# Patient Record
Sex: FEMALE | Race: OTHER | HISPANIC OR LATINO | Employment: UNEMPLOYED | URBAN - METROPOLITAN AREA
[De-identification: names, ages, dates, MRNs, and addresses within clinical notes are randomized per-mention and may not be internally consistent; named-entity substitution may affect disease eponyms.]

---

## 2024-11-11 ENCOUNTER — HOSPITAL ENCOUNTER (OUTPATIENT)
Dept: RADIOLOGY | Facility: HOSPITAL | Age: 49
Discharge: HOME | End: 2024-11-11
Payer: MEDICAID

## 2024-11-11 DIAGNOSIS — N93.9 ABNORMAL UTERINE BLEEDING (AUB): ICD-10-CM

## 2024-11-11 PROCEDURE — 76856 US EXAM PELVIC COMPLETE: CPT | Mod: TC

## 2024-11-13 ENCOUNTER — CLINICAL SUPPORT (OUTPATIENT)
Dept: GYNECOLOGY | Facility: CLINIC | Age: 49
End: 2024-11-13
Payer: MEDICAID

## 2024-11-13 DIAGNOSIS — N93.9 ABNORMAL UTERINE BLEEDING (AUB): Primary | ICD-10-CM

## 2024-11-14 NOTE — PROGRESS NOTES
MD made HIPAA compliant phone call for scheduled telehealth. No answer X2. No voicemail set up. Will re-scheduled for soonest available date.     Latasha Lane MD   PGY2, Obstetrics & Gynecology   [facility]

## 2024-12-13 ENCOUNTER — OFFICE VISIT (OUTPATIENT)
Dept: GYNECOLOGY | Facility: CLINIC | Age: 49
End: 2024-12-13
Payer: MEDICAID

## 2024-12-13 VITALS
RESPIRATION RATE: 18 BRPM | OXYGEN SATURATION: 99 % | HEIGHT: 64 IN | DIASTOLIC BLOOD PRESSURE: 67 MMHG | HEART RATE: 74 BPM | WEIGHT: 140.19 LBS | SYSTOLIC BLOOD PRESSURE: 104 MMHG | TEMPERATURE: 98 F | BODY MASS INDEX: 23.93 KG/M2

## 2024-12-13 DIAGNOSIS — N93.9 ABNORMAL UTERINE BLEEDING (AUB): Primary | ICD-10-CM

## 2024-12-13 PROCEDURE — 99213 OFFICE O/P EST LOW 20 MIN: CPT | Mod: PBBFAC

## 2024-12-14 NOTE — PROGRESS NOTES
[facility] OB/GYN CLINIC NOTE  [facility]  [address] [place] [address]  Phone: [phone]  Fax: [phone]    Subjective:     Pat Irvin is a 48 y.o.  presenting to GYN clinic for follow-up for AUB.     Patient was seen in ED on 10/2 for continuous vaginal bleeding since . She completed 10 day course of provera upon discharge and stopped bleeding on 10/12. No further bleeding since then. Very happy with decreased bleeding profile. Notes that prior to September her cycle was regular lasting 4 days with minimal bleeding profile.          MedHx:   History reviewed. No pertinent past medical history.    SurgHx:   Past Surgical History:   Procedure Laterality Date     SECTION      CHOLECYSTECTOMY      COLD KNIFE CONIZATION OF CERVIX N/A 2023    Procedure: CONE BIOPSY, CERVIX, USING COLD KNIFE;  Surgeon: Karlee Salazar MD;  Location: [facility] OR;  Service: OB/GYN;  Laterality: N/A;    COLONOSCOPY N/A 8/15/2023    Procedure: COLON;  Surgeon: Lamar Bell MD;  Location: [facility] ENDOSCOPY;  Service: Gastroenterology;  Laterality: N/A;    TUBAL LIGATION         Medications:     Current Outpatient Medications:     ALLERGY RELIEF, CETIRIZINE, 10 mg tablet, Take 10 mg by mouth., Disp: , Rfl:     medroxyPROGESTERone (PROVERA) 10 MG tablet, Take 1 tablet (10 mg total) by mouth once daily. (Patient not taking: Reported on 2024), Disp: 21 tablet, Rfl: 0    polyethylene glycol (GLYCOLAX) 17 gram PwPk, Take 17 g by mouth once daily. (Patient not taking: Reported on 2024), Disp: 30 packet, Rfl: 3    SENNA 8.6 mg tablet, Take 1 tablet by mouth once daily. (Patient not taking: Reported on 2024), Disp: 7 tablet, Rfl: 0  No current facility-administered medications for this visit.    Facility-Administered Medications Ordered in Other Visits:     diphenhydrAMINE injection 25 mg, 25 mg, Intravenous, Once PRN, Gaby Rincon MD    HYDROmorphone injection 0.2 mg, 0.2 mg,  "Intravenous, Q5 Min PRN, Gaby Rincon MD    HYDROmorphone injection 0.5 mg, 0.5 mg, Intravenous, Q5 Min PRN, Gaby Rincon MD    lactated ringers infusion, , Intravenous, Continuous, Gaby Rincon MD, Last Rate: 10 mL/hr at 05/30/23 1358, Restarted at 05/30/23 1408    ondansetron injection 4 mg, 4 mg, Intravenous, Once, Gaby Rincon MD    oxyCODONE-acetaminophen 5-325 mg per tablet 2 tablet, 2 tablet, Oral, Once, Gaby Rincon MD    prochlorperazine injection Soln 5 mg, 5 mg, Intravenous, Once PRN, Gaby Rincon MD    FM Hx: Denies hx of ovarian, uterine, endometrial, or colon cancer. Denies history of bleeding or coagulation disorders.  Family History   Family history unknown: Yes       Social Hx: Denies tobacco, alcohol and illicit drug usage.  Social History     Socioeconomic History    Marital status:    Tobacco Use    Smoking status: Never    Smokeless tobacco: Never   Substance and Sexual Activity    Alcohol use: Never    Drug use: Never    Sexual activity: Not Currently       Review of Systems  Denies fevers, chills, headache, blurry vision, nausea, vomiting, dizziness, or syncope.   Denies chest pain, shortness of breath, RUQ pain, or calf pain.    Objective:     Vitals:    12/13/24 1105   BP: 104/67   BP Location: Right arm   Patient Position: Sitting   Pulse: 74   Resp: 18   Temp: 98.2 °F (36.8 °C)   TempSrc: Oral   SpO2: 99%   Weight: 63.6 kg (140 lb 3.2 oz)   Height: 5' 4" (1.626 m)     Body mass index is 24.07 kg/m².    Physical Exam:     General: alert and oriented, in no acute distress  Abdomen: Soft, non-distended, non tender to palpation, no involuntary guarding, no rebound tenderness  Extremities: Normal, atraumatic, non-edematous, No cords or calf tenderness, No significant calf/ankle edema         Imaging  EXAMINATION:  US PELVIS COMP WITH TRANSVAG NON-OB (XPD)     CLINICAL HISTORY:  Abnormal uterine and vaginal bleeding, unspecified.   "   COMPARISON:  None     FINDINGS:  Transvaginal and trans abdominal pelvic ultrasound was performed.  The uterus measures 10.5 x 3.9 x 6.0 cm.  The endometrial stripe measures approximately0.8 cm in thickness.  Cervical nabothian cysts are noted.  The right ovary measures 4.2 x 2.4 x 3.1 cm.  The left ovary measures 2.8 x 2.6 x 3.8 cm.The right ovary demonstrates a 2.2 x 2.2 x 3.1 cm simple appearing cyst.  The left ovary demonstrates a 2.7 x 2.3 x 2.2 cm simple appearing cyst.  Both ovaries demonstrate arterial and venous Doppler flow.  No definite adnexal mass are sonographically demonstrated.  No significant free fluid within the pelvis is sonographically demonstrated.     Impression:     1. Both ovaries demonstrate simple appearing cysts measuring up to 2.6 x 3.5 x 2.1 cm on the right and 2.7 x 2.3 x 2.2 cm on the left.  2. No definite acute abnormalities are sonographically demonstrated.        Pat ramosjoemalcolm Nathaniel Irvin is a 48 y.o.  presenting to GYN clinic for follow-up for AUB.     Problem List Items Addressed This Visit          Renal/    Abnormal uterine bleeding (AUB) - Primary     Patients heavy bleeding resolved after provera course    No bleeding since 10/2024  CBC, TSH WNL   TVUS unremarkable   Discussed the change in bleeding pattern likely 2/2 to postmenopausal status   Risk for endometrial cancer is only age greater than 45   Patient made aware that definition of menopause is no menses for 1 year                 Future Appointments   Date Time Provider Department Center   1/15/2025  9:50 AM Shahrzad Donato, JASONP [facility] GYN [facility]       Patient and plan were discussed with Dr. Salazar .    Follow up for WWE 2025

## 2024-12-14 NOTE — ASSESSMENT & PLAN NOTE
Patients heavy bleeding resolved after provera course    No bleeding since 10/2024  CBC, TSH WNL   TVUS unremarkable   Discussed the change in bleeding pattern likely 2/2 to postmenopausal status   Risk for endometrial cancer is only age greater than 45   Patient made aware that definition of menopause is no menses for 1 year

## 2025-01-15 ENCOUNTER — OFFICE VISIT (OUTPATIENT)
Dept: GYNECOLOGY | Facility: CLINIC | Age: 50
End: 2025-01-15
Payer: MEDICAID

## 2025-01-15 VITALS
HEART RATE: 62 BPM | OXYGEN SATURATION: 98 % | DIASTOLIC BLOOD PRESSURE: 72 MMHG | SYSTOLIC BLOOD PRESSURE: 111 MMHG | HEIGHT: 64 IN | BODY MASS INDEX: 24.14 KG/M2 | TEMPERATURE: 99 F | RESPIRATION RATE: 18 BRPM | WEIGHT: 141.38 LBS

## 2025-01-15 DIAGNOSIS — Z01.419 WOMEN'S ANNUAL ROUTINE GYNECOLOGICAL EXAMINATION: Primary | ICD-10-CM

## 2025-01-15 DIAGNOSIS — Z87.42 HX OF ABNORMAL CERVICAL PAPANICOLAOU SMEAR: ICD-10-CM

## 2025-01-15 PROCEDURE — 3078F DIAST BP <80 MM HG: CPT | Mod: CPTII,,, | Performed by: NURSE PRACTITIONER

## 2025-01-15 PROCEDURE — 99213 OFFICE O/P EST LOW 20 MIN: CPT | Mod: PBBFAC | Performed by: NURSE PRACTITIONER

## 2025-01-15 PROCEDURE — 87624 HPV HI-RISK TYP POOLED RSLT: CPT | Performed by: NURSE PRACTITIONER

## 2025-01-15 PROCEDURE — 1160F RVW MEDS BY RX/DR IN RCRD: CPT | Mod: CPTII,,, | Performed by: NURSE PRACTITIONER

## 2025-01-15 PROCEDURE — 88174 CYTOPATH C/V AUTO IN FLUID: CPT | Performed by: NURSE PRACTITIONER

## 2025-01-15 PROCEDURE — 3008F BODY MASS INDEX DOCD: CPT | Mod: CPTII,,, | Performed by: NURSE PRACTITIONER

## 2025-01-15 PROCEDURE — 99396 PREV VISIT EST AGE 40-64: CPT | Mod: S$PBB,,, | Performed by: NURSE PRACTITIONER

## 2025-01-15 PROCEDURE — 1159F MED LIST DOCD IN RCRD: CPT | Mod: CPTII,,, | Performed by: NURSE PRACTITIONER

## 2025-01-15 PROCEDURE — 3074F SYST BP LT 130 MM HG: CPT | Mod: CPTII,,, | Performed by: NURSE PRACTITIONER

## 2025-01-15 NOTE — PROGRESS NOTES
"Patient ID: Pat Armstrong is a 49 y.o. female.    Chief Complaint: Gynecologic Exam      Review of patient's allergies indicates:  No Known Allergies          HPI:  The patient is  here for annual gyn exam. Pt is s/p CKC 2023 for cervical dysplasia. Pap prior to procedure per MD note-ASCUS HRHPV+; colposcopy significant for HSIL. 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 Pap 2024-NIL; HPV negative. Pt states this was first time that she had an abnormal pap smear. Pt seen by gyn resident clinic for AUB from 2024-. Pt was given short term course of provera with resolution of AUB. LMP 2024. Lasting 4 days. Pt had TL for contraception. No gyn complaints.     Pt is Thai Speaking. Used  line via ipad. -[facility]ell #[id number]    Review of Systems:   Negative except for findings in HPI     Objective:   /72   Pulse 62   Temp 98.5 °F (36.9 °C) (Oral)   Resp 18   Ht 5' 4" (1.626 m)   Wt 64.1 kg (141 lb 6.4 oz)   LMP 2024   SpO2 98%   BMI 24.27 kg/m²    Physical Exam:  GENERAL: Pt is aware and alert and  in no acute distress.  BREASTS: Bilateral-No masses, nipple discharge, skin changes, or tenderness. Right nipple inverted. 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   ABDOMEN: Soft, non tender.  VULVA:  No lesions or skin changes.  URETHRA: No lesions  BLADDER: No tenderness.  VAGINA: Mucosa normal,no discharge; no lesions.  CERVIX:  no CMT, NO discharge; NO lesions  BIMANUAL EXAM: reveals a 8-week-sized uterus. The uterus is mobile, nontender, no palpable masses. Akira adnexa reveal no evidence of masses; no fullness   SKIN: Warm and Dry  PSYCHIATRIC: Patient is awake and alert. Mood and affect are normal.    Assessment:   Women's annual routine gynecological examination  -     Liquid-Based Pap Smear, Screening    Hx of abnormal cervical Papanicolaou smear  -     Mammo Digital Screening Bilat w/ Juan; Future; Expected date: 02/15/2025            1. Women's " annual routine gynecological examination    2. Hx of abnormal cervical Papanicolaou smear               Plan:       Follow up in about 1 year (around 1/15/2026).

## 2025-01-20 LAB
HIGH RISK HPV: NEGATIVE
PSYCHE PATHOLOGY RESULT: NORMAL